# Patient Record
Sex: FEMALE
[De-identification: names, ages, dates, MRNs, and addresses within clinical notes are randomized per-mention and may not be internally consistent; named-entity substitution may affect disease eponyms.]

---

## 2021-08-11 ENCOUNTER — APPOINTMENT (OUTPATIENT)
Dept: GASTROENTEROLOGY | Facility: CLINIC | Age: 56
End: 2021-08-11

## 2023-06-13 PROBLEM — Z00.00 ENCOUNTER FOR PREVENTIVE HEALTH EXAMINATION: Status: ACTIVE | Noted: 2023-06-13

## 2024-09-23 ENCOUNTER — APPOINTMENT (OUTPATIENT)
Dept: PEDIATRIC ALLERGY IMMUNOLOGY | Facility: CLINIC | Age: 59
End: 2024-09-23
Payer: COMMERCIAL

## 2024-09-23 DIAGNOSIS — T78.3XXA ANGIONEUROTIC EDEMA, INITIAL ENCOUNTER: ICD-10-CM

## 2024-09-23 PROCEDURE — 99204 OFFICE O/P NEW MOD 45 MIN: CPT

## 2024-09-23 NOTE — ASSESSMENT
[FreeTextEntry1] : Antihistamines as needed. Discussed pathophysiology of angioedema with her. She has a better understanding of what to expect and what she should do when not due for it.

## 2024-09-23 NOTE — REASON FOR VISIT
[Evaluation/Consultation] : an evaluation/consultation of [FreeTextEntry3] : Swelling of lips and or forehead.

## 2024-09-23 NOTE — HISTORY OF PRESENT ILLNESS
[Asthma] : asthma [Allergic Rhinitis] : allergic rhinitis [Eczematous rashes] : eczematous rashes [Food Allergies] : food allergies [Drug Allergies] : drug allergies [de-identified] : Chidi Unger is a 59-year-old lady who comes today with the following chief complaint.  Around 15 to 20 years ago she began having episodes of tingling of the lips and then swelling of them which could last anywhere from several hours to a full day.  She could not point out any specific cause for it.  Then did not return for approximately 15 years.  And she now has it again.  And no rashes that occurred with it occur with it.  There is no known precipitating event.  She is not on any new medication.  And she does not have any any food or drug allergies associated with this.  She had 5 she is presently she is in a normal state of good health. When she has these episodes she is does not seek any treatment for it.  She does not take any medication for it.  Symptoms resolved within hours to maybe 1 day.  She has no history of any specific food and or drug allergy.